# Patient Record
Sex: MALE | Race: BLACK OR AFRICAN AMERICAN | Employment: FULL TIME | URBAN - METROPOLITAN AREA
[De-identification: names, ages, dates, MRNs, and addresses within clinical notes are randomized per-mention and may not be internally consistent; named-entity substitution may affect disease eponyms.]

---

## 2017-01-10 DIAGNOSIS — E11.9 DIABETES MELLITUS WITHOUT COMPLICATION (HCC): ICD-10-CM

## 2017-01-10 RX ORDER — METFORMIN HYDROCHLORIDE 500 MG/1
TABLET, EXTENDED RELEASE ORAL
Qty: 30 TAB | Refills: 0 | Status: SHIPPED | OUTPATIENT
Start: 2017-01-10 | End: 2017-04-07

## 2017-01-10 NOTE — TELEPHONE ENCOUNTER
Writer attempted to reach patient to inform of 30 day supply of Metformin and that he needs an OV with Dr. Eddie Ritter. No answer.

## 2017-03-08 DIAGNOSIS — E11.9 DIABETES MELLITUS WITHOUT COMPLICATION (HCC): ICD-10-CM

## 2017-03-08 RX ORDER — METFORMIN HYDROCHLORIDE 500 MG/1
TABLET, EXTENDED RELEASE ORAL
Qty: 30 TAB | Refills: 0 | OUTPATIENT
Start: 2017-03-08

## 2017-03-08 NOTE — TELEPHONE ENCOUNTER
Left voicemail notifying patient medication was refused and that needs to call back to schedule an office visit.

## 2017-03-09 RX ORDER — METFORMIN HYDROCHLORIDE 500 MG/1
TABLET, EXTENDED RELEASE ORAL
Qty: 15 TAB | Refills: 0 | Status: SHIPPED | OUTPATIENT
Start: 2017-03-09 | End: 2017-03-28 | Stop reason: SDUPTHER

## 2017-03-09 NOTE — TELEPHONE ENCOUNTER
No appointment booked yet. I did leave a voicemail yesterday notifying to call back to schedule an appointment.

## 2017-03-28 DIAGNOSIS — E11.9 DIABETES MELLITUS WITHOUT COMPLICATION (HCC): ICD-10-CM

## 2017-03-28 RX ORDER — METFORMIN HYDROCHLORIDE 500 MG/1
TABLET, EXTENDED RELEASE ORAL
Qty: 15 TAB | Refills: 0 | Status: SHIPPED | OUTPATIENT
Start: 2017-03-28 | End: 2017-04-07 | Stop reason: SDUPTHER

## 2017-04-07 ENCOUNTER — OFFICE VISIT (OUTPATIENT)
Dept: INTERNAL MEDICINE CLINIC | Age: 32
End: 2017-04-07

## 2017-04-07 VITALS
DIASTOLIC BLOOD PRESSURE: 72 MMHG | TEMPERATURE: 97.7 F | BODY MASS INDEX: 25.9 KG/M2 | HEIGHT: 72 IN | HEART RATE: 74 BPM | OXYGEN SATURATION: 96 % | WEIGHT: 191.2 LBS | SYSTOLIC BLOOD PRESSURE: 114 MMHG | RESPIRATION RATE: 14 BRPM

## 2017-04-07 DIAGNOSIS — E11.9 DIABETES MELLITUS WITHOUT COMPLICATION (HCC): Primary | ICD-10-CM

## 2017-04-07 DIAGNOSIS — R10.32 LEFT LOWER QUADRANT PAIN: ICD-10-CM

## 2017-04-07 RX ORDER — METFORMIN HYDROCHLORIDE 500 MG/1
TABLET, EXTENDED RELEASE ORAL
Qty: 90 TAB | Refills: 1 | Status: SHIPPED | OUTPATIENT
Start: 2017-04-07 | End: 2017-10-20 | Stop reason: SDUPTHER

## 2017-04-07 NOTE — PROGRESS NOTES
1. Have you been to the ER, urgent care clinic since your last visit? Hospitalized since your last visit? Yes, [facility] 4[facility]2/17, diagnosed with Strep throat, on antibiotics [id number]    2. Have you seen or consulted any other health care providers outside of the [address] [place] [address] since your last visit? Include any pap smears or colon screening. Yes, see above    Chief Complaint   Patient presents with    Diabetes     Fasting.

## 2017-04-07 NOTE — MR AVS SNAPSHOT
Visit Information Date & Time Provider Department Dept. Phone Encounter #  
 4/7/2017  9:00 AM Rashel Bailey MD [facility] [phone] [id number] Follow-up Instructions Return in about 6 months (around 10/7/2017). Follow-up and Disposition History Your Appointments 10/10/2017  4:15 PM  
ROUTINE CARE with Rashel Bailey MD  
[facility] Appt Note: 6 months follow up visit [facility] [address] [facility] [address]  
[facility] [address] [facility] [address] [address] Upcoming Health Maintenance Date Due  
 FOOT EXAM Q1 8/19/1996 MICROALBUMIN Q1 8/19/1996 EYE EXAM RETINAL OR DILATED Q1 8/19/1996 Pneumococcal 19-64 Medium Risk (1 of 1 - PPSV23) 8/19/2005 DTaP/Tdap/Td series (1 - Tdap) 8/19/2007 HEMOGLOBIN A1C Q6M 9/15/2016 LIPID PANEL Q1 12/2/2016 Allergies as of 4/7/2017  Review Complete On: 4/7/2017 By: Laurie Vergara No Known Allergies Current Immunizations  Never Reviewed No immunizations on file. Not reviewed this visit You Were Diagnosed With   
  
 Codes Comments Diabetes mellitus without complication ([facility]    -  Primary ICD-10-CM: E11.9 ICD-9-CM: 250.00 Left lower quadrant pain     ICD-10-CM: R10.32 
ICD-9-CM: 789.04 Vitals BP Pulse Temp Resp Height(growth percentile) Weight(growth percentile) 114/72 (BP 1 Location: Left arm, BP Patient Position: Sitting) 74 97.7 °F (36.5 °C) (Oral) 14 6' (1.829 m) 191 lb 3.2 oz (86.7 kg) SpO2 BMI Smoking Status 96% 25.93 kg/m2 Former Smoker Vitals History BMI and BSA Data Body Mass Index Body Surface Area  
 25.93 kg/m 2 2.1 m 2 Preferred Pharmacy Pharmacy Name Phone [facility] [address] [place] [address] [phone] Your Updated Medication List  
  
   
 This list is accurate as of: 4/7/17  9:33 AM.  Always use your most recent med list.  
  
  
  
  
 albuterol 90 mcg/actuation inhaler Commonly known as:  PROVENTIL HFA, VENTOLIN HFA, PROAIR HFA Take 2 Puffs by inhalation every four (4) hours as needed for Wheezing. AMOXICILLIN PO Take  by mouth. Indications: taking for strep, unsure of dosage  
  
 cpap machine kit  
by Does Not Apply route.  
  
 ergocalciferol 50,000 unit capsule Commonly known as:  ERGOCALCIFEROL Take 1 capsule once a week for 4 weeks then decrease to 1 capsule once a month for 11 months. metFORMIN  mg tablet Commonly known as:  GLUCOPHAGE XR  
TAKE ONE TABLET BY MOUTH ONCE DAILY WITH DINNER  
  
 multivitamin tablet Commonly known as:  ONE A DAY Take 1 Tab by mouth daily. Prescriptions Sent to Pharmacy Refills  
 metFORMIN ER (GLUCOPHAGE XR) 500 mg tablet 1 Sig: TAKE ONE TABLET BY MOUTH ONCE DAILY WITH DINNER Class: Normal  
 Pharmacy: [address] [place] [address] #: [phone] We Performed the Following HEMOGLOBIN A1C WITH EAG [44862 CPT(R)] LIPID PANEL [44720 CPT(R)] METABOLIC PANEL, COMPREHENSIVE [92555 CPT(R)] MICROALBUMIN, UR, RAND W/ MICROALBUMIN/CREA RATIO [id number] CPT(R)] REFERRAL TO NUTRITION [REF50 Custom] Comments:  
 Please evaluate patient for dm. Please schedule and authorize patient for services daily Follow-up Instructions Return in about 6 months (around 10/7/2017). Referral Information Referral ID Referred By Referred To  
  
 [id number] Denise Hudson Not Available Visits Status Start Date End Date 1 New Request 4/7/17 4/7/18 If your referral has a status of pending review or denied, additional information will be sent to support the outcome of this decision. Introducing [facility] & HEALTH SERVICES! Dear Justin Munoz: Thank you for requesting a Level account. Our records indicate that you have previously registered for a Level account but its currently inactive. Please call our Level support line at [phone]. Additional Information If you have questions, please visit the Frequently Asked Questions section of the Level website at [email or web address] [email or web address] Remember, Level is NOT to be used for urgent needs. For medical emergencies, dial 911. Now available from your iPhone and Android! Please provide this summary of care documentation to your next provider. Your primary care clinician is listed as [phone] [address] [place] [address] [address]. If you have any questions after today's visit, please call [phone].

## 2017-04-07 NOTE — PROGRESS NOTES
HISTORY OF PRESENT ILLNESS  Jordyn Ward is a 28 y.o. male. HPI  Here for dm. He was diagnosed last year and started on metformin but has not been seen since. He has not gone to nutrition. He has had some intermittent abdominal pain since starting metformin. He takes this a  3 pm.  Past Medical History:   Diagnosis Date    Asthma     DM (diabetes mellitus) ([address]     Orthostatic hypotension     CRUZITO on CPAP     PPD positive, treated 2001    Sleep apnea      Current Outpatient Prescriptions   Medication Sig    metFORMIN ER (GLUCOPHAGE XR) 500 mg tablet TAKE ONE TABLET BY MOUTH ONCE DAILY WITH DINNER    multivitamin (ONE A DAY) tablet Take 1 Tab by mouth daily.  albuterol (PROVENTIL HFA, VENTOLIN HFA, PROAIR HFA) 90 mcg/actuation inhaler Take 2 Puffs by inhalation every four (4) hours as needed for Wheezing.  ergocalciferol (ERGOCALCIFEROL) 50,000 unit capsule Take 1 capsule once a week for 4 weeks then decrease to 1 capsule once a month for 11 months.  cpap machine kit by Does Not Apply route. No current facility-administered medications for this visit. Review of Systems   All other systems reviewed and are negative. Visit Vitals    /72 (BP 1 Location: Left arm, BP Patient Position: Sitting)    Pulse 74    Temp 97.7 °F (36.5 °C) (Oral)    Resp 14    Ht 6' (1.829 m)    Wt 191 lb 3.2 oz (86.7 kg)    SpO2 96%    BMI 25.93 kg/m2       Physical Exam   Constitutional: He appears well-developed and well-nourished. Nursing note and vitals reviewed. Lab Results   Component Value Date/Time    Hemoglobin A1c 6.9 03/15/2016 10:13 AM       ASSESSMENT and PLAN  Kobi Altman was seen today for diabetes.     Diagnoses and all orders for this visit:    Diabetes mellitus without complication ([facility]  -     METABOLIC PANEL, COMPREHENSIVE  -     LIPID PANEL  -     HEMOGLOBIN A1C WITH EAG  -     MICROALBUMIN, UR, RAND W/ MICROALBUMIN/CREA RATIO  -     metFORMIN ER (GLUCOPHAGE XR) 500 mg tablet; TAKE ONE TABLET BY MOUTH ONCE DAILY WITH DINNER  -     REFERRAL TO NUTRITION  The current medical regimen is effective;  continue present plan and medications. Left lower quadrant pain  Take meds on a full stomach.       Reviewed plan of care with the patient who has provided input and agrees with the goals

## 2017-04-08 LAB
ALBUMIN SERPL-MCNC: 4.7 G/DL (ref 3.5–5.5)
ALBUMIN/CREAT UR: 2.8 MG/G CREAT (ref 0–30)
ALBUMIN/GLOB SERPL: 1.4 {RATIO} (ref 1.2–2.2)
ALP SERPL-CCNC: 60 IU/L (ref 39–117)
ALT SERPL-CCNC: 42 IU/L (ref 0–44)
AST SERPL-CCNC: 33 IU/L (ref 0–40)
BILIRUB SERPL-MCNC: 0.6 MG/DL (ref 0–1.2)
BUN SERPL-MCNC: 14 MG/DL (ref 6–20)
BUN/CREAT SERPL: 15 (ref 9–20)
CALCIUM SERPL-MCNC: 9.9 MG/DL (ref 8.7–10.2)
CHLORIDE SERPL-SCNC: 97 MMOL/L (ref 96–106)
CHOLEST SERPL-MCNC: 142 MG/DL (ref 100–199)
CO2 SERPL-SCNC: 28 MMOL/L (ref 18–29)
CREAT SERPL-MCNC: 0.94 MG/DL (ref 0.76–1.27)
CREAT UR-MCNC: 159.2 MG/DL
EST. AVERAGE GLUCOSE BLD GHB EST-MCNC: 123 MG/DL
GLOBULIN SER CALC-MCNC: 3.3 G/DL (ref 1.5–4.5)
GLUCOSE SERPL-MCNC: 80 MG/DL (ref 65–99)
HBA1C MFR BLD: 5.9 % (ref 4.8–5.6)
HDLC SERPL-MCNC: 33 MG/DL
INTERPRETATION, 910389: NORMAL
LDLC SERPL CALC-MCNC: 86 MG/DL (ref 0–99)
Lab: NORMAL
MICROALBUMIN UR-MCNC: 4.4 UG/ML
POTASSIUM SERPL-SCNC: 4.4 MMOL/L (ref 3.5–5.2)
PROT SERPL-MCNC: 8 G/DL (ref 6–8.5)
SODIUM SERPL-SCNC: 141 MMOL/L (ref 134–144)
TRIGL SERPL-MCNC: 114 MG/DL (ref 0–149)
VLDLC SERPL CALC-MCNC: 23 MG/DL (ref 5–40)

## 2017-04-20 ENCOUNTER — OFFICE VISIT (OUTPATIENT)
Dept: INTERNAL MEDICINE CLINIC | Age: 32
End: 2017-04-20

## 2017-04-20 VITALS
HEIGHT: 72 IN | HEART RATE: 76 BPM | OXYGEN SATURATION: 95 % | RESPIRATION RATE: 16 BRPM | BODY MASS INDEX: 26.19 KG/M2 | WEIGHT: 193.4 LBS | DIASTOLIC BLOOD PRESSURE: 57 MMHG | TEMPERATURE: 98 F | SYSTOLIC BLOOD PRESSURE: 108 MMHG

## 2017-04-20 DIAGNOSIS — B88.0 CHIGGERS: Primary | ICD-10-CM

## 2017-04-20 RX ORDER — FLUOCINONIDE 0.5 MG/G
OINTMENT TOPICAL 2 TIMES DAILY
Qty: 15 G | Refills: 0 | Status: SHIPPED | OUTPATIENT
Start: 2017-04-20 | End: 2017-10-20

## 2017-04-20 NOTE — PROGRESS NOTES
HISTORY OF PRESENT ILLNESS  Prateek Cassidy is a 28 y.o. male. HPI  Here for itchy rash that developed a couple of days ago. He fell into some grass with his dog. Past Medical History:   Diagnosis Date    Asthma     DM (diabetes mellitus) ([address]     Orthostatic hypotension     CRUZITO on CPAP     PPD positive, treated 2001    Sleep apnea      Current Outpatient Prescriptions   Medication Sig    fluocinoNIDE (LIDEX) 0.05 % ointment Apply  to affected area two (2) times a day.  metFORMIN ER (GLUCOPHAGE XR) 500 mg tablet TAKE ONE TABLET BY MOUTH ONCE DAILY WITH DINNER    multivitamin (ONE A DAY) tablet Take 1 Tab by mouth daily.  albuterol (PROVENTIL HFA, VENTOLIN HFA, PROAIR HFA) 90 mcg/actuation inhaler Take 2 Puffs by inhalation every four (4) hours as needed for Wheezing.  cpap machine kit by Does Not Apply route.  ergocalciferol (ERGOCALCIFEROL) 50,000 unit capsule Take 1 capsule once a week for 4 weeks then decrease to 1 capsule once a month for 11 months. No current facility-administered medications for this visit. Review of Systems   All other systems reviewed and are negative. Visit Vitals    /57 (BP 1 Location: Left arm, BP Patient Position: At rest)    Pulse 76    Temp 98 °F (36.7 °C) (Oral)    Resp 16    Ht 6' (1.829 m)    Wt 193 lb 6.4 oz (87.7 kg)    SpO2 95%    BMI 26.23 kg/m2       Physical Exam   Constitutional: He appears well-developed and well-nourished. Skin: Rash noted. scattered papules on elbows. Nursing note and vitals reviewed. ASSESSMENT and PLAN  Daryl Vargas was seen today for rash. Diagnoses and all orders for this visit:    Chiggers  -     fluocinoNIDE (LIDEX) 0.05 % ointment; Apply  to affected area two (2) times a day.     Reviewed plan of care with the patient who has provided input and agrees with the goals

## 2017-04-20 NOTE — MR AVS SNAPSHOT
Visit Information Date & Time Provider Department Dept. Phone Encounter #  
 4/20/2017  9:00 AM Mariajose Lamb MD [facility] [phone] [id number] Your Appointments 10/10/2017  4:15 PM  
ROUTINE CARE with Mariajose Lamb MD  
[facility]) Appt Note: 6 months follow up visit [place] [address] Counts include 234 beds at the [facility] [id number] [place] [address] [address] [facility] [address] [address] Upcoming Health Maintenance Date Due  
 FOOT EXAM Q1 8/19/1996 EYE EXAM RETINAL OR DILATED Q1 8/19/1996 Pneumococcal 19-64 Medium Risk (1 of 1 - PPSV23) 8/19/2005 DTaP/Tdap/Td series (1 - Tdap) 8/19/2007 HEMOGLOBIN A1C Q6M 10/7/2017 MICROALBUMIN Q1 4/7/2018 LIPID PANEL Q1 4/7/2018 Allergies as of 4/20/2017  Review Complete On: 4/20/2017 By: Shante Turner No Known Allergies Current Immunizations  Never Reviewed No immunizations on file. Not reviewed this visit You Were Diagnosed With   
  
 Codes Comments Chiggers    -  Primary ICD-10-CM: B88.0 ICD-9-CM: 133.8 Vitals BP Pulse Temp Resp Height(growth percentile) Weight(growth percentile) 108/57 (BP 1 Location: Left arm, BP Patient Position: At rest) 76 98 °F (36.7 °C) (Oral) 16 6' (1.829 m) 193 lb 6.4 oz (87.7 kg) SpO2 BMI Smoking Status 95% 26.23 kg/m2 Former Smoker Vitals History BMI and BSA Data Body Mass Index Body Surface Area  
 26.23 kg/m 2 2.11 m 2 Preferred Pharmacy Pharmacy Name Phone [facility] [address] [place] [address] [phone] Your Updated Medication List  
  
   
This list is accurate as of: 4/20/17  9:21 AM.  Always use your most recent med list.  
  
  
  
  
 albuterol 90 mcg/actuation inhaler Commonly known as:  PROVENTIL HFA, VENTOLIN HFA, PROAIR HFA  
 Take 2 Puffs by inhalation every four (4) hours as needed for Wheezing. cpap machine kit  
by Does Not Apply route.  
  
 ergocalciferol 50,000 unit capsule Commonly known as:  ERGOCALCIFEROL Take 1 capsule once a week for 4 weeks then decrease to 1 capsule once a month for 11 months. fluocinoNIDE 0.05 % ointment Commonly known as:  LIDEX Apply  to affected area two (2) times a day. metFORMIN  mg tablet Commonly known as:  GLUCOPHAGE XR  
TAKE ONE TABLET BY MOUTH ONCE DAILY WITH DINNER  
  
 multivitamin tablet Commonly known as:  ONE A DAY Take 1 Tab by mouth daily. Prescriptions Sent to Pharmacy Refills  
 fluocinoNIDE (LIDEX) 0.05 % ointment 0 Sig: Apply  to affected area two (2) times a day. Class: Normal  
 Pharmacy: [address] Ph #: [phone] Route: Topical  
  
Introducing [facility] & HEALTH SERVICES! Dear Daryl Vargas: Thank you for requesting a [employer] account. Our records indicate that you have previously registered for a [employer]atix account but its currently inactive. Please call our [employer] support line at [phone]. Additional Information If you have questions, please visit the Frequently Asked Questions section of the [employer] website at [email or web address] Revantha [email or web address]/Axxia [email or web address]/. Remember, [employer]at[employer] is NOT to be used for urgent needs. For medical emergencies, dial 911. Now available from your iPhone and Android! Please provide this summary of care documentation to your next provider. Your primary care clinician is listed as [phone] [address] [place] [address] [address]. If you have any questions after today's visit, please call [phone].

## 2017-04-20 NOTE — PROGRESS NOTES
1. Have you been to the ER, urgent care clinic since your last visit? Hospitalized since your last visit? No    2. Have you seen or consulted any other health care providers outside of the [address] [place] [address] since your last visit? Include any pap smears or colon screening. No   Chief Complaint   Patient presents with    Rash     elbows, knees, and right wrist  for 5 days.       Not fasting

## 2017-05-22 DIAGNOSIS — J45.20 MILD INTERMITTENT ASTHMA WITHOUT COMPLICATION: ICD-10-CM

## 2017-05-23 RX ORDER — ALBUTEROL SULFATE 90 UG/1
2 AEROSOL, METERED RESPIRATORY (INHALATION)
Qty: 1 INHALER | Refills: 5 | Status: SHIPPED | OUTPATIENT
Start: 2017-05-23 | End: 2017-10-20 | Stop reason: SDUPTHER

## 2017-10-20 ENCOUNTER — OFFICE VISIT (OUTPATIENT)
Dept: INTERNAL MEDICINE CLINIC | Age: 32
End: 2017-10-20

## 2017-10-20 VITALS
BODY MASS INDEX: 25.55 KG/M2 | SYSTOLIC BLOOD PRESSURE: 122 MMHG | OXYGEN SATURATION: 97 % | RESPIRATION RATE: 16 BRPM | DIASTOLIC BLOOD PRESSURE: 77 MMHG | WEIGHT: 188.6 LBS | TEMPERATURE: 98.2 F | HEIGHT: 72 IN | HEART RATE: 67 BPM

## 2017-10-20 DIAGNOSIS — Z23 NEED FOR TDAP VACCINATION: ICD-10-CM

## 2017-10-20 DIAGNOSIS — Z23 NEED FOR 23-POLYVALENT PNEUMOCOCCAL POLYSACCHARIDE VACCINE: ICD-10-CM

## 2017-10-20 DIAGNOSIS — K58.0 IRRITABLE BOWEL SYNDROME WITH DIARRHEA: ICD-10-CM

## 2017-10-20 DIAGNOSIS — Z23 ENCOUNTER FOR IMMUNIZATION: ICD-10-CM

## 2017-10-20 DIAGNOSIS — E11.9 DIABETES MELLITUS WITHOUT COMPLICATION (HCC): Primary | ICD-10-CM

## 2017-10-20 DIAGNOSIS — J45.20 MILD INTERMITTENT ASTHMA WITHOUT COMPLICATION: ICD-10-CM

## 2017-10-20 RX ORDER — ALBUTEROL SULFATE 90 UG/1
2 AEROSOL, METERED RESPIRATORY (INHALATION)
Qty: 1 INHALER | Refills: 5 | Status: SHIPPED | OUTPATIENT
Start: 2017-10-20 | End: 2018-11-19 | Stop reason: SDUPTHER

## 2017-10-20 RX ORDER — METFORMIN HYDROCHLORIDE 500 MG/1
TABLET, EXTENDED RELEASE ORAL
Qty: 90 TAB | Refills: 1 | Status: SHIPPED | OUTPATIENT
Start: 2017-10-20 | End: 2018-11-19 | Stop reason: SDUPTHER

## 2017-10-20 NOTE — PROGRESS NOTES
HISTORY OF PRESENT ILLNESS  Gus Waller is a 33 y.o. male. HPI  Here for DM well controlled on oral agents. He was diagnosed last year. He is due for labs. He has mild asthma. He needs vaccines. He has apparent IBS with diarrhea and abdominal cramps that preceded his metformin usage. Past Medical History:   Diagnosis Date    Asthma     DM (diabetes mellitus) ([address]     Orthostatic hypotension     CRUZITO on CPAP     PPD positive, treated 2001    Sleep apnea      Current Outpatient Prescriptions   Medication Sig    albuterol (PROVENTIL HFA, VENTOLIN HFA, PROAIR HFA) 90 mcg/actuation inhaler Take 2 Puffs by inhalation every four (4) hours as needed for Wheezing.  metFORMIN ER (GLUCOPHAGE XR) 500 mg tablet TAKE ONE TABLET BY MOUTH ONCE DAILY WITH DINNER    multivitamin (ONE A DAY) tablet Take 1 Tab by mouth daily.  cpap machine kit by Does Not Apply route. No current facility-administered medications for this visit. Review of Systems   All other systems reviewed and are negative. Visit Vitals    /77 (BP 1 Location: Left arm, BP Patient Position: At rest)    Pulse 67    Temp 98.2 °F (36.8 °C) (Oral)    Resp 16    Ht 6' (1.829 m)    Wt 188 lb 9.6 oz (85.5 kg)    SpO2 97%    BMI 25.58 kg/m2         Physical Exam   Constitutional: He appears well-developed and well-nourished. Pulmonary/Chest: Effort normal and breath sounds normal. No respiratory distress. He has no wheezes. He has no rales. Nursing note and vitals reviewed. Lab Results   Component Value Date/Time    Hemoglobin A1c 5.9 04/07/2017 09:40 AM       ASSESSMENT and PLAN  Diagnoses and all orders for this visit:    1.  Diabetes mellitus without complication (HCC)  -     HEMOGLOBIN A1C WITH EAG  -     METABOLIC PANEL, COMPREHENSIVE  -     LIPID PANEL  -     metFORMIN ER (GLUCOPHAGE XR) 500 mg tablet; TAKE ONE TABLET BY MOUTH ONCE DAILY WITH DINNER  The current medical regimen is effective;  continue present plan and medications. 2. Mild intermittent asthma without complication  -     albuterol (PROVENTIL HFA, VENTOLIN HFA, PROAIR HFA) 90 mcg/actuation inhaler; Take 2 Puffs by inhalation every four (4) hours as needed for Wheezing. The current medical regimen is effective;  continue present plan and medications. 3. Irritable bowel syndrome with diarrhea  meds declined. 4. Need for Tdap vaccination  Rx given. 5. Need for 23-polyvalent pneumococcal polysaccharide vaccine    6.  Encounter for immunization    Reviewed plan of care with the patient who has provided input and agrees with the goals

## 2017-10-20 NOTE — MR AVS SNAPSHOT
Visit Information Date & Time Provider Department Dept. Phone Encounter #  
 10/20/2017  2:45 PM Carmelita Baez MD [facility] [phone] [id number] Follow-up Instructions Return in about 6 months (around 4/20/2018). Follow-up and Disposition History Upcoming Health Maintenance Date Due  
 FOOT EXAM Q1 8/19/1996 EYE EXAM RETINAL OR DILATED Q1 8/19/1996 Pneumococcal 19-64 Medium Risk (1 of 1 - PPSV23) 8/19/2005 DTaP/Tdap/Td series (1 - Tdap) 8/19/2007 INFLUENZA AGE 9 TO ADULT 8/1/2017 HEMOGLOBIN A1C Q6M 10/7/2017 MICROALBUMIN Q1 4/7/2018 LIPID PANEL Q1 4/7/2018 Allergies as of 10/20/2017  Review Complete On: 10/20/2017 By: Jace Winters No Known Allergies Current Immunizations  Never Reviewed No immunizations on file. Not reviewed this visit You Were Diagnosed With   
  
 Codes Comments Diabetes mellitus without complication ([facility]    -  Primary ICD-10-CM: E11.9 ICD-9-CM: 250.00 Mild intermittent asthma without complication     ERU-92-TL: J45.20 ICD-9-CM: 493.90 Irritable bowel syndrome with diarrhea     ICD-10-CM: K58.0 ICD-9-CM: 135.0 Need for Tdap vaccination     ICD-10-CM: Z98 ICD-9-CM: V06.1 Need for 23-polyvalent pneumococcal polysaccharide vaccine     ICD-10-CM: I99 ICD-9-CM: V03.82 Encounter for immunization     ICD-10-CM: M41 ICD-9-CM: V03.89 Vitals BP Pulse Temp Resp Height(growth percentile) Weight(growth percentile) 122/77 (BP 1 Location: Left arm, BP Patient Position: At rest) 67 98.2 °F (36.8 °C) (Oral) 16 6' (1.829 m) 188 lb 9.6 oz (85.5 kg) SpO2 BMI Smoking Status 97% 25.58 kg/m2 Former Smoker BMI and BSA Data Body Mass Index Body Surface Area 25.58 kg/m 2 2.08 m 2 Preferred Pharmacy Pharmacy Name Phone [facility] [address] [place] [address] [phone] Your Updated Medication List  
  
   
This list is accurate as of: 10/20/17  3:20 PM.  Always use your most recent med list.  
  
  
  
  
 albuterol 90 mcg/actuation inhaler Commonly known as:  PROVENTIL HFA, VENTOLIN HFA, PROAIR HFA Take 2 Puffs by inhalation every four (4) hours as needed for Wheezing. cpap machine kit  
by Does Not Apply route. metFORMIN  mg tablet Commonly known as:  GLUCOPHAGE XR  
TAKE ONE TABLET BY MOUTH ONCE DAILY WITH DINNER  
  
 multivitamin tablet Commonly known as:  ONE A DAY Take 1 Tab by mouth daily. Prescriptions Sent to Pharmacy Refills  
 albuterol (PROVENTIL HFA, VENTOLIN HFA, PROAIR HFA) 90 mcg/actuation inhaler 5 Sig: Take 2 Puffs by inhalation every four (4) hours as needed for Wheezing. Class: Normal  
 Pharmacy: [address] Ph #: [phone] Route: Inhalation  
 metFORMIN ER (GLUCOPHAGE XR) 500 mg tablet 1 Sig: TAKE ONE TABLET BY MOUTH ONCE DAILY WITH DINNER Class: Normal  
 Pharmacy: [address] Ph #: [phone] We Performed the Following HEMOGLOBIN A1C WITH EAG [00171 CPT(R)] LIPID PANEL [20962 CPT(R)] METABOLIC PANEL, COMPREHENSIVE [06287 CPT(R)] Follow-up Instructions Return in about 6 months (around 4/20/2018). Introducing [facility] & HEALTH SERVICES! Dear Juan Mauricio: Thank you for requesting a [employer] account. Our records indicate that you have previously registered for a [employer] account but its currently inactive. Please call our [employer] support line at [phone]. Additional Information If you have questions, please visit the Frequently Asked Questions section of the [employer] website at [email or web address] Mobile. [email or web address]. [place] [employer]/LYNX Network Groupt/. Remember, [employer] is NOT to be used for urgent needs. For medical emergencies, dial 911. Now available from your iPhone and Android! Please provide this summary of care documentation to your next provider. Your primary care clinician is listed as [phone] [address] [place] [address] [address]. If you have any questions after today's visit, please call [phone].

## 2017-10-20 NOTE — PROGRESS NOTES
1. Have you been to the ER, urgent care clinic since your last visit? Hospitalized since your last visit? No    2. Have you seen or consulted any other health care providers outside of the [address] [place] [address] since your last visit? Include any pap smears or colon screening.  No   Chief Complaint   Patient presents with    Diabetes     6 months follow up     Not fasting

## 2018-11-19 ENCOUNTER — OFFICE VISIT (OUTPATIENT)
Dept: INTERNAL MEDICINE CLINIC | Age: 33
End: 2018-11-19

## 2018-11-19 VITALS
TEMPERATURE: 98.6 F | WEIGHT: 200 LBS | RESPIRATION RATE: 18 BRPM | HEART RATE: 64 BPM | OXYGEN SATURATION: 98 % | SYSTOLIC BLOOD PRESSURE: 123 MMHG | DIASTOLIC BLOOD PRESSURE: 75 MMHG | HEIGHT: 72 IN | BODY MASS INDEX: 27.09 KG/M2

## 2018-11-19 DIAGNOSIS — Z23 ENCOUNTER FOR IMMUNIZATION: Primary | ICD-10-CM

## 2018-11-19 DIAGNOSIS — J45.20 MILD INTERMITTENT ASTHMA WITHOUT COMPLICATION: ICD-10-CM

## 2018-11-19 DIAGNOSIS — E11.9 DIABETES MELLITUS WITHOUT COMPLICATION (HCC): ICD-10-CM

## 2018-11-19 RX ORDER — METFORMIN HYDROCHLORIDE 500 MG/1
TABLET, EXTENDED RELEASE ORAL
Qty: 90 TAB | Refills: 1 | Status: SHIPPED | OUTPATIENT
Start: 2018-11-19

## 2018-11-19 RX ORDER — ALBUTEROL SULFATE 90 UG/1
2 AEROSOL, METERED RESPIRATORY (INHALATION)
Qty: 1 INHALER | Refills: 5 | Status: SHIPPED | OUTPATIENT
Start: 2018-11-19

## 2018-11-19 NOTE — PROGRESS NOTES
HISTORY OF PRESENT ILLNESS Lukas Urbano is a 29 y.o. male. Chief Complaint Patient presents with  Diabetes  
  follow up Health Maintenance Due Topic Date Due  
 FOOT EXAM Q1  08/19/1996  
 EYE EXAM RETINAL OR DILATED Q1  08/19/1996  Pneumococcal 19-64 Medium Risk (1 of 1 - PPSV23) 08/19/2005  DTaP/Tdap/Td series (1 - Tdap) 08/19/2007  
 HEMOGLOBIN A1C Q6M  10/07/2017  MICROALBUMIN Q1  04/07/2018  LIPID PANEL Q1  04/07/2018  Influenza Age 5 to Adult  08/01/2018 Influenza: will get this done today. HPI 
DM II - Very overdue for recheck due to not having health insurance. Pt notes that someone else is paying for this appointment. Controlled at last check:  
Lab Results Component Value Date/Time Hemoglobin A1c 5.9 (H) 04/07/2017 09:40 AM  
 
Lab Results Component Value Date/Time Cholesterol, total 142 04/07/2017 09:40 AM  
 HDL Cholesterol 33 (L) 04/07/2017 09:40 AM  
 LDL, calculated 86 04/07/2017 09:40 AM  
 VLDL, calculated 23 04/07/2017 09:40 AM  
 Triglyceride 114 04/07/2017 09:40 AM  
 
Lab Results Component Value Date/Time Microalb/Creat ratio (ug/mg creat.) 2.8 04/07/2017 09:40 AM  
 
Wt Readings from Last 3 Encounters:  
11/19/18 200 lb (90.7 kg) 10/20/17 188 lb 9.6 oz (85.5 kg) 04/20/17 193 lb 6.4 oz (87.7 kg) Pt has gained weight since last visit. Notes that he needs to eat more vegetables. Eating protein with meals regularly. Able to afford healthy food. Off of metformin >6 months due to not being able to refill (needed follow up) Mild Diarrhea ~2x/week. No blood in stool/melena. Fatigue - getting 7-8 hours/night. Using CPAP regularly. Not falling asleep while driving, but notes fatigue daily even with adequate sleep. Very rare asthma flares. Would like albuterol refilled. Review of Systems Constitutional: Positive for malaise/fatigue. Negative for weight loss. Respiratory: Negative for shortness of breath. Cardiovascular: Negative for chest pain and palpitations. Gastrointestinal: Positive for diarrhea. Negative for abdominal pain, blood in stool, constipation, melena, nausea and vomiting. Neurological: Negative for dizziness, loss of consciousness and weakness. Psychiatric/Behavioral: Negative for depression. Physical Exam  
Constitutional: He is oriented to person, place, and time. He appears well-developed and well-nourished. No distress. HENT:  
Head: Normocephalic and atraumatic. Neck: Neck supple. No JVD present. Cardiovascular: Normal rate, regular rhythm and normal heart sounds. Pulmonary/Chest: Effort normal and breath sounds normal. No respiratory distress. Musculoskeletal: He exhibits no edema. Neurological: He is alert and oriented to person, place, and time. Skin: Skin is warm and dry. Psychiatric: He has a normal mood and affect. His behavior is normal. Judgment and thought content normal.  
Nursing note and vitals reviewed. Diabetic foot exam:  
 
Left: Reflexes 1+ Vibratory sensation normal  
 Proprioception normal 
 Sharp/dull discrimination normal  
 Filament test normal sensation with micro filament Pulse DP: 2+ (normal) Pulse PT: 2+ (normal) Deformities: Mild - callus throughout B dorsal feet Right: Reflexes 1+ Vibratory sensation normal 
 Proprioception normal 
 Sharp/dull discrimination normal 
 Filament test normal sensation with micro filament Pulse DP: 2+ (normal) Pulse PT: 2+ (normal) Deformities: Mild - callus throughout B dorsal feet ASSESSMENT and PLAN 
  ICD-10-CM ICD-9-CM 1. Encounter for immunization Z23 V03.89 INFLUENZA VIRUS VAC QUAD,SPLIT,PRESV FREE SYRINGE IM 2. Mild intermittent asthma without complication Q42.97 641.01 albuterol (PROVENTIL HFA, VENTOLIN HFA, PROAIR HFA) 90 mcg/actuation inhaler 3. Diabetes mellitus without complication (HCC) D57.0 250.00 metFORMIN ER (GLUCOPHAGE XR) 500 mg tablet METABOLIC PANEL, COMPREHENSIVE  
   HEMOGLOBIN A1C W/O EAG  
   HM DIABETES FOOT EXAM  
   LIPID PANEL MICROALBUMIN, UR, RAND W/ MICROALB/CREAT RATIO  
   REFERRAL TO OPTOMETRY

## 2018-11-22 LAB
ALBUMIN SERPL-MCNC: 4.9 G/DL (ref 3.5–5.5)
ALBUMIN/CREAT UR: 3.6 MG/G CREAT (ref 0–30)
ALBUMIN/GLOB SERPL: 1.6 {RATIO} (ref 1.2–2.2)
ALP SERPL-CCNC: 60 IU/L (ref 39–117)
ALT SERPL-CCNC: 48 IU/L (ref 0–44)
AST SERPL-CCNC: 31 IU/L (ref 0–40)
BILIRUB SERPL-MCNC: 0.6 MG/DL (ref 0–1.2)
BUN SERPL-MCNC: 15 MG/DL (ref 6–20)
BUN/CREAT SERPL: 14 (ref 9–20)
CALCIUM SERPL-MCNC: 9.9 MG/DL (ref 8.7–10.2)
CHLORIDE SERPL-SCNC: 98 MMOL/L (ref 96–106)
CHOLEST SERPL-MCNC: 156 MG/DL (ref 100–199)
CO2 SERPL-SCNC: 28 MMOL/L (ref 20–29)
CREAT SERPL-MCNC: 1.04 MG/DL (ref 0.76–1.27)
CREAT UR-MCNC: 261 MG/DL
GLOBULIN SER CALC-MCNC: 3 G/DL (ref 1.5–4.5)
GLUCOSE SERPL-MCNC: 82 MG/DL (ref 65–99)
HBA1C MFR BLD: 5.9 % (ref 4.8–5.6)
HDLC SERPL-MCNC: 35 MG/DL
INTERPRETATION, 910389: NORMAL
LDLC SERPL CALC-MCNC: 99 MG/DL (ref 0–99)
Lab: NORMAL
MICROALBUMIN UR-MCNC: 9.4 UG/ML
POTASSIUM SERPL-SCNC: 4.1 MMOL/L (ref 3.5–5.2)
PROT SERPL-MCNC: 7.9 G/DL (ref 6–8.5)
SODIUM SERPL-SCNC: 142 MMOL/L (ref 134–144)
TRIGL SERPL-MCNC: 109 MG/DL (ref 0–149)
VLDLC SERPL CALC-MCNC: 22 MG/DL (ref 5–40)

## 2018-12-18 ENCOUNTER — TELEPHONE (OUTPATIENT)
Dept: INTERNAL MEDICINE CLINIC | Age: 33
End: 2018-12-18

## 2019-05-22 ENCOUNTER — OFFICE VISIT (OUTPATIENT)
Dept: INTERNAL MEDICINE CLINIC | Age: 34
End: 2019-05-22

## 2019-05-22 VITALS
HEIGHT: 72 IN | DIASTOLIC BLOOD PRESSURE: 74 MMHG | WEIGHT: 200.8 LBS | HEART RATE: 91 BPM | OXYGEN SATURATION: 99 % | TEMPERATURE: 98.6 F | SYSTOLIC BLOOD PRESSURE: 112 MMHG | RESPIRATION RATE: 18 BRPM | BODY MASS INDEX: 27.2 KG/M2

## 2019-05-22 DIAGNOSIS — E11.9 DIABETES MELLITUS WITHOUT COMPLICATION (HCC): Primary | ICD-10-CM

## 2019-05-22 DIAGNOSIS — J01.90 ACUTE NON-RECURRENT SINUSITIS, UNSPECIFIED LOCATION: ICD-10-CM

## 2019-05-22 LAB — HBA1C MFR BLD HPLC: 5.7 %

## 2019-05-22 NOTE — PROGRESS NOTES
Korey Diaz is a 29 y.o. male  Chief Complaint   Patient presents with    Diabetes     follow up      Visit Vitals  /74 (BP 1 Location: Left arm, BP Patient Position: At rest)   Pulse 91   Temp 98.6 °F (37 °C) (Oral)   Resp 18   Ht 6' (1.829 m)   Wt 200 lb 12.8 oz (91.1 kg)   SpO2 99%   BMI 27.23 kg/m²     Health Maintenance Due   Topic Date Due    Pneumococcal 0-64 years (1 of 1 - PPSV23) 08/19/1992    EYE EXAM RETINAL OR DILATED  08/19/1996    DTaP/Tdap/Td series (1 - Tdap) 08/19/2007    HEMOGLOBIN A1C Q6M  05/21/2019       HPI  DM II - Due for follow up. A1c is controlled! Lab Results   Component Value Date/Time    Hemoglobin A1c 5.9 (H) 11/21/2018 10:16 AM    Hemoglobin A1c (POC) 5.7 05/22/2019 10:20 AM     Lab Results   Component Value Date/Time    Cholesterol, total 156 11/21/2018 10:16 AM    HDL Cholesterol 35 (L) 11/21/2018 10:16 AM    LDL, calculated 99 11/21/2018 10:16 AM    VLDL, calculated 22 11/21/2018 10:16 AM    Triglyceride 109 11/21/2018 10:16 AM     Lab Results   Component Value Date/Time    Microalb/Creat ratio (ug/mg creat.) 3.6 11/21/2018 10:16 AM     Currently taking:   Key Antihyperglycemic Medications             metFORMIN ER (GLUCOPHAGE XR) 500 mg tablet TAKE ONE TABLET BY MOUTH ONCE DAILY WITH DINNER        Wt Readings from Last 3 Encounters:   05/22/19 200 lb 12.8 oz (91.1 kg)   11/19/18 200 lb (90.7 kg)   10/20/17 188 lb 9.6 oz (85.5 kg)   Weight is stable. 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 Cough - mildly productive occasionally x 2 weeks - improving. Has albuterol but hasn't needed it. Feels that this is resolving. Review of Systems   Constitutional: Positive for malaise/fatigue. Negative for fever. HENT: Positive for congestion. Negative for ear pain. Respiratory: Positive for cough and sputum production. Negative for shortness of breath. Cardiovascular: Negative for chest pain and palpitations. Neurological: Positive for headaches.  Negative for dizziness, loss of consciousness and weakness. Endo/Heme/Allergies: Negative for environmental allergies. Physical Exam   Constitutional: He is oriented to person, place, and time. He appears well-developed and well-nourished. No distress. HENT:   Head: Normocephalic and atraumatic. Mouth/Throat: Oropharynx is clear and moist.   Bilateral TMs with fluid. No erythema. Nasal mucosa red, inflamed. Eyes: Conjunctivae are normal.   Neck: Neck supple. No JVD present. No bruit bilateral carotid arteries. Cardiovascular: Normal rate, regular rhythm and normal heart sounds. Pulmonary/Chest: Effort normal and breath sounds normal. No respiratory distress. Musculoskeletal: He exhibits no edema. Lymphadenopathy:     He has no cervical adenopathy. Neurological: He is alert and oriented to person, place, and time. Skin: Skin is warm and dry. Psychiatric: He has a normal mood and affect. His behavior is normal. Judgment and thought content normal.   Nursing note and vitals reviewed. Diagnoses and all orders for this visit:    1. Diabetes mellitus without complication (HCC)  -     AMB POC HEMOGLOBIN A1C    2. Acute non-recurrent sinusitis, unspecified location  Resolving. Send message for antibiotic INI 1 week.

## 2019-05-22 NOTE — PATIENT INSTRUCTIONS
Dr. Erika Perez [address] [facility], [address] 
[phone] Dr. Edwige Ojeda [place] [address] [place] [address], [address] 
[phone] [employer] 
[phone] [address], [place] [address] Phone: [phone]

## 2019-05-22 NOTE — PROGRESS NOTES
1. Have you been to the ER, urgent care clinic since your last visit? Hospitalized since your last visit? No    2. Have you seen or consulted any other health care providers outside of the [address] [place] [address] since your last visit? Include any pap smears or colon screening.  No   Chief Complaint   Patient presents with    Diabetes     follow up     Fasting
